# Patient Record
Sex: MALE | Race: WHITE | Employment: FULL TIME | URBAN - METROPOLITAN AREA
[De-identification: names, ages, dates, MRNs, and addresses within clinical notes are randomized per-mention and may not be internally consistent; named-entity substitution may affect disease eponyms.]

---

## 2017-01-09 ENCOUNTER — HOSPITAL ENCOUNTER (OUTPATIENT)
Dept: LAB | Age: 57
Discharge: HOME | End: 2017-01-09

## 2017-01-09 PROCEDURE — 88305 TISSUE EXAM BY PATHOLOGIST: CPT | Performed by: UROLOGY

## 2022-04-04 PROBLEM — Z87.891 EX-SMOKER: Status: ACTIVE | Noted: 2022-04-04

## 2022-04-04 PROBLEM — I65.23 OCCLUSION AND STENOSIS OF BILATERAL CAROTID ARTERIES: Status: ACTIVE | Noted: 2022-04-04

## 2022-04-04 PROBLEM — K21.9 GASTRO-ESOPHAGEAL REFLUX DISEASE WITHOUT ESOPHAGITIS: Status: ACTIVE | Noted: 2021-10-04

## 2022-04-04 PROBLEM — R51.9 HEADACHE: Status: ACTIVE | Noted: 2021-10-04

## 2022-04-04 PROBLEM — I37.9 PULMONARY VALVE DISORDER: Status: ACTIVE | Noted: 2022-04-04

## 2022-04-04 PROBLEM — I36.9 NONRHEUMATIC TRICUSPID VALVE DISORDER: Status: ACTIVE | Noted: 2022-04-04

## 2022-04-04 PROBLEM — H93.13 BILATERAL TINNITUS: Status: ACTIVE | Noted: 2021-10-04

## 2022-04-04 PROBLEM — N40.0 BENIGN PROSTATIC HYPERPLASIA WITHOUT URINARY OBSTRUCTION: Status: ACTIVE | Noted: 2022-04-04

## 2022-04-04 PROBLEM — F41.1 GENERALIZED ANXIETY DISORDER: Status: ACTIVE | Noted: 2021-10-04

## 2022-05-16 ENCOUNTER — HOSPITAL ENCOUNTER (OUTPATIENT)
Dept: LAB | Age: 62
Discharge: HOME | End: 2022-05-16
Payer: COMMERCIAL

## 2022-05-16 DIAGNOSIS — R79.89 ELEVATED FERRITIN: ICD-10-CM

## 2022-05-16 LAB
ALBUMIN SERPL-MCNC: 3.6 G/DL (ref 3.2–4.6)
ALBUMIN/GLOB SERPL: 1.1 {RATIO} (ref 1.2–3.5)
ALP SERPL-CCNC: 51 U/L (ref 50–136)
ALT SERPL-CCNC: 39 U/L (ref 12–65)
ANION GAP SERPL CALC-SCNC: 4 MMOL/L (ref 7–16)
AST SERPL-CCNC: 34 U/L (ref 15–37)
BASOPHILS # BLD: 0 K/UL (ref 0–0.2)
BASOPHILS NFR BLD: 1 % (ref 0–2)
BILIRUB SERPL-MCNC: 0.5 MG/DL (ref 0.2–1.1)
BUN SERPL-MCNC: 17 MG/DL (ref 8–23)
CALCIUM SERPL-MCNC: 8.6 MG/DL (ref 8.3–10.4)
CHLORIDE SERPL-SCNC: 110 MMOL/L (ref 98–107)
CO2 SERPL-SCNC: 28 MMOL/L (ref 21–32)
CREAT SERPL-MCNC: 1.3 MG/DL (ref 0.8–1.5)
DIFFERENTIAL METHOD BLD: NORMAL
EOSINOPHIL # BLD: 0.2 K/UL (ref 0–0.8)
EOSINOPHIL NFR BLD: 3 % (ref 0.5–7.8)
ERYTHROCYTE [DISTWIDTH] IN BLOOD BY AUTOMATED COUNT: 13.2 % (ref 11.9–14.6)
FERRITIN SERPL-MCNC: 281 NG/ML (ref 8–388)
GLOBULIN SER CALC-MCNC: 3.3 G/DL (ref 2.3–3.5)
GLUCOSE SERPL-MCNC: 99 MG/DL (ref 65–100)
HAV IGM SER QL: NONREACTIVE
HBV CORE IGM SER QL: NONREACTIVE
HBV SURFACE AG SER QL: NONREACTIVE
HCT VFR BLD AUTO: 44.9 %
HCV AB SER QL: NONREACTIVE
HGB BLD-MCNC: 14.4 G/DL (ref 13.6–17.2)
IMM GRANULOCYTES # BLD AUTO: 0 K/UL (ref 0–0.5)
IMM GRANULOCYTES NFR BLD AUTO: 0 % (ref 0–5)
IRON SATN MFR SERPL: 40 %
IRON SERPL-MCNC: 121 UG/DL (ref 35–150)
LYMPHOCYTES # BLD: 2 K/UL (ref 0.5–4.6)
LYMPHOCYTES NFR BLD: 31 % (ref 13–44)
MCH RBC QN AUTO: 28.9 PG (ref 26.1–32.9)
MCHC RBC AUTO-ENTMCNC: 32.1 G/DL (ref 31.4–35)
MCV RBC AUTO: 90.2 FL (ref 79.6–97.8)
MONOCYTES # BLD: 0.5 K/UL (ref 0.1–1.3)
MONOCYTES NFR BLD: 8 % (ref 4–12)
NEUTS SEG # BLD: 3.8 K/UL (ref 1.7–8.2)
NEUTS SEG NFR BLD: 58 % (ref 43–78)
NRBC # BLD: 0 K/UL (ref 0–0.2)
PLATELET # BLD AUTO: 209 K/UL (ref 150–450)
PMV BLD AUTO: 11.8 FL (ref 9.4–12.3)
POTASSIUM SERPL-SCNC: 4.4 MMOL/L (ref 3.5–5.1)
PROT SERPL-MCNC: 6.9 G/DL (ref 6.3–8.2)
RBC # BLD AUTO: 4.98 M/UL (ref 4.23–5.6)
SODIUM SERPL-SCNC: 142 MMOL/L (ref 136–145)
TIBC SERPL-MCNC: 304 UG/DL (ref 250–450)
WBC # BLD AUTO: 6.6 K/UL (ref 4.3–11.1)

## 2022-05-16 PROCEDURE — 81256 HFE GENE: CPT

## 2022-05-16 PROCEDURE — 36415 COLL VENOUS BLD VENIPUNCTURE: CPT

## 2022-05-16 PROCEDURE — 80074 ACUTE HEPATITIS PANEL: CPT

## 2022-05-16 PROCEDURE — 80053 COMPREHEN METABOLIC PANEL: CPT

## 2022-05-16 PROCEDURE — 82728 ASSAY OF FERRITIN: CPT

## 2022-05-16 PROCEDURE — 85025 COMPLETE CBC W/AUTO DIFF WBC: CPT

## 2022-05-16 PROCEDURE — 83540 ASSAY OF IRON: CPT

## 2022-05-20 LAB — HFE GENE MUT ANL BLD/T: NORMAL

## 2022-06-06 NOTE — PATIENT INSTRUCTIONS
Patient Instructions from Today's Visit    Reason for Visit:  Follow up - Results review      Plan:  - Reviewed previous work up, no concerns noted. Follow Up:  - as needed      Treatment Summary has been discussed and given to patient: n/a        -------------------------------------------------------------------------------------------------------------------  Please call our office at [phone] if you have any  of the following symptoms:   · Fever of 100.5 or greater  · Chills  · Shortness of breath  · Swelling or pain in one leg    After office hours an answering service is available and will contact a provider for emergencies or if you are experiencing any of the above symptoms.  Patient did not express an interest in My Chart. My Chart log in information explained on the after visit summary printout at the [facility] desk.     Radha Zavala RN

## 2022-06-08 ENCOUNTER — OFFICE VISIT (OUTPATIENT)
Dept: ONCOLOGY | Age: 62
End: 2022-06-08
Payer: COMMERCIAL

## 2022-06-08 VITALS
RESPIRATION RATE: 18 BRPM | TEMPERATURE: 98 F | BODY MASS INDEX: 40.49 KG/M2 | SYSTOLIC BLOOD PRESSURE: 136 MMHG | WEIGHT: 305.5 LBS | DIASTOLIC BLOOD PRESSURE: 80 MMHG | HEIGHT: 73 IN | OXYGEN SATURATION: 95 % | HEART RATE: 71 BPM

## 2022-06-08 DIAGNOSIS — R79.89 ELEVATED FERRITIN: Primary | ICD-10-CM

## 2022-06-08 PROCEDURE — 99213 OFFICE O/P EST LOW 20 MIN: CPT | Performed by: INTERNAL MEDICINE

## 2022-06-08 ASSESSMENT — PATIENT HEALTH QUESTIONNAIRE - PHQ9
SUM OF ALL RESPONSES TO PHQ9 QUESTIONS 1 & 2: 0
SUM OF ALL RESPONSES TO PHQ QUESTIONS 1-9: 0
2. FEELING DOWN, DEPRESSED OR HOPELESS: 0
SUM OF ALL RESPONSES TO PHQ QUESTIONS 1-9: 0
1. LITTLE INTEREST OR PLEASURE IN DOING THINGS: 0

## 2022-06-08 NOTE — PROGRESS NOTES
[facility] Hematology and Oncology: Office Visit Established Patient    Chief Complaint:    Chief Complaint   Patient presents with    Follow-up         History of Present Illness:  Mr. Viviana Bhatti is a 62 y.o. male who returns today for management of an elevated ferritin, most recently 403. This was noted on routine lab work but has been persistent since at least last September, when it was 1500 while he was inpatient at [facility]. No other iron studies are available. We recommended additional iron studies including transferrin sat %, with further work-up to be based on results. He returns for lab results today. No new complaints. Review of Systems:  Constitutional: Negative. HENT: Negative. Eyes: Negative. Respiratory: Negative. Cardiovascular: Negative. Gastrointestinal: Negative. Genitourinary: Negative. Musculoskeletal: Negative. Skin: Negative. Neurological: Negative. Endo/Heme/Allergies: Negative. Psychiatric/Behavioral: Negative. All other systems reviewed and are negative. Allergies   Allergen Reactions    Codeine Other (See Comments)     Dizziness.      Past Medical History:   Diagnosis Date    Elevated PSA      Past Surgical History:   Procedure Laterality Date    UROLOGICAL SURGERY       Family History   Problem Relation Age of Onset    No Known Problems Mother     No Known Problems Father      Social History     Socioeconomic History    Marital status:      Spouse name: Not on file    Number of children: Not on file    Years of education: Not on file    Highest education level: Not on file   Occupational History    Not on file   Tobacco Use    Smoking status: Former Smoker     Quit date: 10/9/1991     Years since quittin.6    Smokeless tobacco: Never Used   Substance and Sexual Activity    Alcohol use: No    Drug use: Not on file    Sexual activity: Not on file   Other Topics Concern    Not on file   Social History Narrative    Not on file     Social Determinants of Health     Financial Resource Strain:     Difficulty of Paying Living Expenses: Not on file   Food Insecurity:     Worried About Running Out of Food in the Last Year: Not on file    [place] of Food in the Last Year: Not on file   Transportation Needs:     Lack of Transportation (Medical): Not on file    Lack of Transportation (Non-Medical):  Not on file   Physical Activity:     Days of Exercise per Week: Not on file    Minutes of Exercise per Session: Not on file   Stress:     Feeling of Stress : Not on file   Social Connections:     Frequency of Communication with Friends and Family: Not on file    Frequency of Social Gatherings with Friends and Family: Not on file    Attends Restorationism Services: Not on file    Active Member of [address] [place] [address] [employer] or Organizations: Not on file    Attends Club or Organization Meetings: Not on file    Marital Status: Not on file   Intimate Partner Violence:     Fear of Current or Ex-Partner: Not on file    Emotionally Abused: Not on file    Physically Abused: Not on file    Sexually Abused: Not on file   Housing Stability:     Unable to Pay for Housing in the Last Year: Not on file    Number of [place] in the Last Year: Not on file    Unstable Housing in the Last Year: Not on file     Current Outpatient Medications   Medication Sig Dispense Refill    omeprazole (PRILOSEC) 40 MG delayed release capsule 1 capsule 30 minutes before morning meal      tamsulosin (FLOMAX) 0.4 MG capsule Take 0.4 mg by mouth daily      diclofenac (VOLTAREN) 50 MG EC tablet TAKE 1 TABLET TWICE A DAY AS NEEDED FOR PAIN WITH FOOD (Patient not taking: Reported on 6/8/2022) 60 tablet 0    colchicine (MITIGARE) 0.6 MG capsule 1 twice a day for 3 days then as needed for gout dispense any controlled colchicine coinsurance, (Patient not taking: Reported on 6/8/2022)      finasteride (PROSCAR) 5 MG tablet Take 5 mg by mouth daily (Patient not taking: Reported on 6/8/2022)       No current facility-administered medications for this visit. OBJECTIVE:  /80 (Site: Left Upper Arm, Position: Standing)   Pulse 71   Temp 98 °F (36.7 °C)   Resp 18   Ht 6' 0.5\" (1.842 m)   Wt (!) 305 lb 8 oz (138.6 kg)   SpO2 95%   BMI 40.86 kg/m²     Physical Exam:  Constitutional: Well developed, well nourished male in no acute distress, sitting comfortably on the examination table. HEENT: Normocephalic and atraumatic. Sclerae anicteric. Skin Warm and dry. No bruising and no rash noted. No erythema. No pallor. Cardiopulmonary Deferred. Neuro Grossly nonfocal with no obvious sensory or motor deficits. MSK Normal range of motion in general.  No edema and no tenderness. Psych Appropriate mood and affect. Labs:  No results found for this or any previous visit (from the past 96 hour(s)). Imaging:  No results found. ASSESSMENT:   Diagnosis Orders   1. Elevated ferritin           PLAN:  Lab studies were personally reviewed. Elevated ferritin: unclear duration but present since at least September 2021 when he was hospitalized for COVID.  1500 at that time, 400 at time of referral.  No other iron studies available. No family history of iron overload disorders. We recommended additional iron studies including transferrin saturation. Thankfully, his repeat ferritin was down to the normal range, at 281, and his TSAT was 40%, making iron overload very unlikely and the hyperferritinemia more likely to have been an acute phase reactant. I see no indication for screening for occult liver disease with the improvement in ferritin, and no indication for HFE gene mutation testing to screen for hemochromatosis. He understands and agrees to proceed. All questions were asked  and answered to the best of my ability. F/u PRN with hematology.             Mark Chan MD, MD  [facility] Hematology and Oncology  [address] [place] [id number]  Office : [phone]  Fax : [phone]

## 2022-09-09 DIAGNOSIS — R97.20 ELEVATED PROSTATE SPECIFIC ANTIGEN (PSA): Primary | ICD-10-CM

## 2022-12-19 ENCOUNTER — OFFICE VISIT (OUTPATIENT)
Dept: UROLOGY | Age: 62
End: 2022-12-19
Payer: COMMERCIAL

## 2022-12-19 DIAGNOSIS — N40.0 BENIGN PROSTATIC HYPERPLASIA WITHOUT LOWER URINARY TRACT SYMPTOMS: ICD-10-CM

## 2022-12-19 DIAGNOSIS — R30.0 DYSURIA: Primary | ICD-10-CM

## 2022-12-19 DIAGNOSIS — R97.20 ELEVATED PROSTATE SPECIFIC ANTIGEN (PSA): ICD-10-CM

## 2022-12-19 LAB
BILIRUBIN, URINE, POC: NEGATIVE
BLOOD URINE, POC: NORMAL
GLUCOSE URINE, POC: NEGATIVE
KETONES, URINE, POC: NEGATIVE
LEUKOCYTE ESTERASE, URINE, POC: NEGATIVE
NITRITE, URINE, POC: NEGATIVE
PH, URINE, POC: 5.5 (ref 4.6–8)
PROTEIN,URINE, POC: NEGATIVE
SPECIFIC GRAVITY, URINE, POC: 1.03 (ref 1–1.03)
URINALYSIS CLARITY, POC: NORMAL
URINALYSIS COLOR, POC: NORMAL
UROBILINOGEN, POC: 0.2

## 2022-12-19 PROCEDURE — 99213 OFFICE O/P EST LOW 20 MIN: CPT | Performed by: UROLOGY

## 2022-12-19 PROCEDURE — 81003 URINALYSIS AUTO W/O SCOPE: CPT | Performed by: UROLOGY

## 2022-12-19 RX ORDER — TAMSULOSIN HYDROCHLORIDE 0.4 MG/1
0.4 CAPSULE ORAL DAILY
Qty: 90 CAPSULE | Refills: 3 | Status: SHIPPED | OUTPATIENT
Start: 2022-12-19

## 2022-12-19 NOTE — PROGRESS NOTES
[facility] Urology  [address]   [address], [address] [facility]  [phone]  Michael Ayala  : 1960    No chief complaint on file. HPI     Michael Ayala is a 65 y.o. male  last seen in  by me for elevated PSA ; Referred by Dr. Aurora Sims for evaluation and treatment of elevated PSA PSA 7.67 , testosterone 309 in 10-16. Takes Doxazosin 2 mg with improvement of LUTS. This was increased to  4 mg. ELIA showed 3+ benign prostate. Prostate biopsy done 17:       PROSTATE VOLUME: 91.25 gm PSAD 0.08 PSA 7.67   Pathology:     DIAGNOSIS    A: \"PROSTATE, LEFT LATERAL BASE, BIOPSY\":    NO CARCINOMA IDENTIFIED. B: \"PROSTATE, LEFT LATERAL MID, BIOPSY\":    NO CARCINOMA IDENTIFIED. INFLAMMATION. C: \"PROSTATE, LEFT LATERAL APEX, BIOPSY\":    NO CARCINOMA IDENTIFIED. INFLAMMATION. D: \"PROSTATE, LEFT BASE, BIOPSY\":    NO CARCINOMA IDENTIFIED. INFLAMMATION. E: \"PROSTATE, LEFT MID, BIOPSY\":    NO CARCINOMA IDENTIFIED. INFLAMMATION. F: \"PROSTATE, LEFT APEX, BIOPSY\":    NO CARCINOMA IDENTIFIED. G: \"PROSTATE, RIGHT BASE, BIOPSY\":    NO CARCINOMA IDENTIFIED. INFLAMMATION. H: \"PROSTATE, RIGHT MID, BIOPSY\":    NO CARCINOMA IDENTIFIED. INFLAMMATION. I: \"PROSTATE, RIGHT APEX, BIOPSY\":    NO CARCINOMA IDENTIFIED. INFLAMMATION. J: \"PROSTATE, RIGHT LATERAL BASE, BIOPSY\":    NO CARCINOMA IDENTIFIED. INFLAMMATION. K: \"PROSTATE, RIGHT LATERAL MID, BIOPSY\":    NO CARCINOMA IDENTIFIED. INFLAMMATION. L: \"PROSTATE, RIGHT LATERAL APEX, BIOPSY\":    NO CARCINOMA IDENTIFIED. INFLAMMATION. Was seen 10-15-21 with c/o not being able to void for about 17 hrs. C/o suprapubic pain. He had Covid 21.  ml by US then. Had exam by Louise Price recently, he states that his PSA was 7. exam showed ELIA: external hemorrhoids, prostate 3+, no nodules. Stevens was inserted with 800 ml urine obtained. Started on tamsulosin. Stevens removed 10-18-21.    He is voiding much better on tamsulosin. PVR 58 ml by US in . In 3-22, total PSA 7.5 with 24.8% free PSA. Past Medical History:   Diagnosis Date    Elevated PSA      Past Surgical History:   Procedure Laterality Date    UROLOGICAL SURGERY       Current Outpatient Medications   Medication Sig Dispense Refill    tamsulosin (FLOMAX) 0.4 MG capsule Take 1 capsule by mouth daily 90 capsule 3    omeprazole (PRILOSEC) 40 MG delayed release capsule 1 capsule 30 minutes before morning meal      diclofenac (VOLTAREN) 50 MG EC tablet TAKE 1 TABLET TWICE A DAY AS NEEDED FOR PAIN WITH FOOD (Patient not taking: Reported on 2022) 60 tablet 0    colchicine (MITIGARE) 0.6 MG capsule 1 twice a day for 3 days then as needed for gout dispense any controlled colchicine coinsurance, (Patient not taking: Reported on 2022)      finasteride (PROSCAR) 5 MG tablet Take 5 mg by mouth daily (Patient not taking: No sig reported)       No current facility-administered medications for this visit. Allergies   Allergen Reactions    Codeine Other (See Comments)     Dizziness.      Social History     Socioeconomic History    Marital status:      Spouse name: Not on file    Number of children: Not on file    Years of education: Not on file    Highest education level: Not on file   Occupational History    Not on file   Tobacco Use    Smoking status: Former     Types: Cigarettes     Quit date: 10/9/1991     Years since quittin.2    Smokeless tobacco: Never   Substance and Sexual Activity    Alcohol use: No    Drug use: Not on file    Sexual activity: Not on file   Other Topics Concern    Not on file   Social History Narrative    Not on file     Social Determinants of Health     Financial Resource Strain: Not on file   Food Insecurity: Not on file   Transportation Needs: Not on file   Physical Activity: Not on file   Stress: Not on file   Social Connections: Not on file   Intimate Partner Violence: Not on file   Housing Stability: Not on file     Family History   Problem Relation Age of Onset    No Known Problems Mother     No Known Problems Father        ROS    There were no vitals taken for this visit. Urinalysis  UA - Dipstick  @LASTPROCAMB([id number])@    UA - Micro  WBC - 0  RBC - 0  Bacteria - 0  Epith - 0    Physical Exam    Assessment and Plan   Diagnosis Orders   1. Dysuria  AMB POC URINALYSIS DIP STICK AUTO W/O MICRO      2. Elevated prostate specific antigen (PSA)  PSA, Diagnostic      3. Benign prostatic hyperplasia without lower urinary tract symptoms  tamsulosin (FLOMAX) 0.4 MG capsule            Orders Placed This Encounter   Procedures    PSA, Diagnostic     Standing Status:   Future     Standing Expiration Date:   12/19/2023    AMB POC URINALYSIS DIP STICK AUTO W/O MICRO       Follow-up and Dispositions    Return in about 3 months (around 3/19/2023) for PSA, CBC before. Refill tamsulosin. Return in about 3 months (around 3/19/2023) for PSA, CBC before.

## 2023-03-20 ENCOUNTER — OFFICE VISIT (OUTPATIENT)
Dept: UROLOGY | Age: 63
End: 2023-03-20
Payer: COMMERCIAL

## 2023-03-20 DIAGNOSIS — N40.0 BENIGN PROSTATIC HYPERPLASIA WITHOUT LOWER URINARY TRACT SYMPTOMS: ICD-10-CM

## 2023-03-20 DIAGNOSIS — N40.0 BENIGN PROSTATIC HYPERPLASIA WITHOUT LOWER URINARY TRACT SYMPTOMS: Primary | ICD-10-CM

## 2023-03-20 LAB
BILIRUBIN, URINE, POC: NEGATIVE
BLOOD URINE, POC: ABNORMAL
GLUCOSE URINE, POC: NEGATIVE
KETONES, URINE, POC: NEGATIVE
LEUKOCYTE ESTERASE, URINE, POC: NEGATIVE
NITRITE, URINE, POC: NEGATIVE
PH, URINE, POC: 5.5 (ref 4.6–8)
PROTEIN,URINE, POC: NEGATIVE
SPECIFIC GRAVITY, URINE, POC: 1.03 (ref 1–1.03)
URINALYSIS CLARITY, POC: ABNORMAL
URINALYSIS COLOR, POC: ABNORMAL
UROBILINOGEN, POC: NORMAL

## 2023-03-20 PROCEDURE — 99213 OFFICE O/P EST LOW 20 MIN: CPT | Performed by: UROLOGY

## 2023-03-20 PROCEDURE — 81003 URINALYSIS AUTO W/O SCOPE: CPT | Performed by: UROLOGY

## 2023-03-20 RX ORDER — TAMSULOSIN HYDROCHLORIDE 0.4 MG/1
0.4 CAPSULE ORAL DAILY
Qty: 90 CAPSULE | Refills: 3 | Status: SHIPPED | OUTPATIENT
Start: 2023-03-20

## 2023-03-20 NOTE — PROGRESS NOTES
obtained. Started on tamsulosin. Stevens removed 10-18-21. He is voiding much better on tamsulosin. PVR 58 ml by US in . In 3-22, total PSA 7.5 with 24.8% free PSA. Past Medical History:   Diagnosis Date    Elevated PSA      Past Surgical History:   Procedure Laterality Date    UROLOGICAL SURGERY       Current Outpatient Medications   Medication Sig Dispense Refill    tamsulosin (FLOMAX) 0.4 MG capsule Take 1 capsule by mouth daily 90 capsule 3     No current facility-administered medications for this visit. Allergies   Allergen Reactions    Codeine Other (See Comments)     Dizziness. Social History     Socioeconomic History    Marital status:      Spouse name: Not on file    Number of children: Not on file    Years of education: Not on file    Highest education level: Not on file   Occupational History    Not on file   Tobacco Use    Smoking status: Former     Types: Cigarettes     Quit date: 10/9/1991     Years since quittin.4    Smokeless tobacco: Never   Substance and Sexual Activity    Alcohol use: No    Drug use: Not on file    Sexual activity: Not on file   Other Topics Concern    Not on file   Social History Narrative    Not on file     Social Determinants of Health     Financial Resource Strain: Not on file   Food Insecurity: Not on file   Transportation Needs: Not on file   Physical Activity: Not on file   Stress: Not on file   Social Connections: Not on file   Intimate Partner Violence: Not on file   Housing Stability: Not on file     Family History   Problem Relation Age of Onset    No Known Problems Mother     No Known Problems Father        ROS    There were no vitals taken for this visit.     Urinalysis  UA - Dipstick  Results for orders placed or performed in visit on 23   AMB POC URINALYSIS DIP STICK AUTO W/O MICRO   Result Value Ref Range    Color, Urine, POC      Clarity, Urine, POC      Glucose, Urine, POC Negative Negative    Bilirubin, Urine, POC Negative

## 2023-03-21 LAB
PSA FREE MFR SERPL: 18.8 %
PSA FREE SERPL-MCNC: 1.8 NG/ML
PSA SERPL-MCNC: 9.6 NG/ML

## 2023-03-27 ENCOUNTER — TELEPHONE (OUTPATIENT)
Dept: UROLOGY | Age: 63
End: 2023-03-27

## 2023-03-28 DIAGNOSIS — R97.20 ELEVATED PROSTATE SPECIFIC ANTIGEN (PSA): Primary | ICD-10-CM

## 2023-10-11 DIAGNOSIS — N40.0 BENIGN PROSTATIC HYPERPLASIA WITHOUT LOWER URINARY TRACT SYMPTOMS: ICD-10-CM

## 2023-10-11 RX ORDER — TAMSULOSIN HYDROCHLORIDE 0.4 MG/1
CAPSULE ORAL DAILY
Qty: 90 CAPSULE | Refills: 3 | Status: SHIPPED | OUTPATIENT
Start: 2023-10-11

## 2024-06-10 ENCOUNTER — NURSE ONLY (OUTPATIENT)
Dept: UROLOGY | Age: 64
End: 2024-06-10

## 2024-06-10 DIAGNOSIS — R97.20 ELEVATED PROSTATE SPECIFIC ANTIGEN (PSA): ICD-10-CM

## 2024-06-10 LAB — PSA SERPL-MCNC: 7.3 NG/ML (ref 0–4)

## 2024-06-17 ENCOUNTER — OFFICE VISIT (OUTPATIENT)
Dept: UROLOGY | Age: 64
End: 2024-06-17
Payer: COMMERCIAL

## 2024-06-17 DIAGNOSIS — R30.0 DYSURIA: ICD-10-CM

## 2024-06-17 DIAGNOSIS — R97.20 ELEVATED PROSTATE SPECIFIC ANTIGEN (PSA): Primary | ICD-10-CM

## 2024-06-17 DIAGNOSIS — N40.0 BENIGN PROSTATIC HYPERPLASIA WITHOUT LOWER URINARY TRACT SYMPTOMS: ICD-10-CM

## 2024-06-17 LAB
BILIRUBIN, URINE, POC: NEGATIVE
BLOOD URINE, POC: NEGATIVE
GLUCOSE URINE, POC: NEGATIVE
KETONES, URINE, POC: NEGATIVE
LEUKOCYTE ESTERASE, URINE, POC: NEGATIVE
NITRITE, URINE, POC: NEGATIVE
PH, URINE, POC: 7.5 (ref 4.6–8)
PROTEIN,URINE, POC: NEGATIVE
SPECIFIC GRAVITY, URINE, POC: 1.02 (ref 1–1.03)
URINALYSIS CLARITY, POC: NORMAL
URINALYSIS COLOR, POC: NORMAL
UROBILINOGEN, POC: NORMAL

## 2024-06-17 PROCEDURE — 81003 URINALYSIS AUTO W/O SCOPE: CPT | Performed by: UROLOGY

## 2024-06-17 PROCEDURE — 99213 OFFICE O/P EST LOW 20 MIN: CPT | Performed by: UROLOGY

## 2024-06-17 RX ORDER — TAMSULOSIN HYDROCHLORIDE 0.4 MG/1
0.4 CAPSULE ORAL DAILY
Qty: 90 CAPSULE | Refills: 3 | Status: SHIPPED | OUTPATIENT
Start: 2024-06-17

## 2024-06-17 ASSESSMENT — ENCOUNTER SYMPTOMS
NAUSEA: 0
BACK PAIN: 0

## 2024-06-17 NOTE — PROGRESS NOTES
[facility]rology  [address] [place] [id number]  [phone]    Stefan Sanchez  : 1960    Chief Complaint   Patient presents with    Follow-up        HPI     Stefan Sanchez is a 63 y.o. male   hx of  elevated PSA, BPH/LUTS on tamsulosin, 90 gm prostate.  ; Referred by Dr. Molina for evaluation and treatment of elevated PSA PSA 7.67 , testosterone 309 in 10-16.      Takes Doxazosin 2 mg with improvement of LUTS. This was increased to  4 mg.    ELIA showed 3+ benign prostate.    Prostate biopsy done 17:       PROSTATE VOLUME: 91.25 gm PSAD 0.08 PSA 7.67   Pathology:     DIAGNOSIS    A: \"PROSTATE, LEFT LATERAL BASE, BIOPSY\":    NO CARCINOMA IDENTIFIED.    B: \"PROSTATE, LEFT LATERAL MID, BIOPSY\":    NO CARCINOMA IDENTIFIED.    INFLAMMATION.    C: \"PROSTATE, LEFT LATERAL APEX, BIOPSY\":    NO CARCINOMA IDENTIFIED.    INFLAMMATION.    D: \"PROSTATE, LEFT BASE, BIOPSY\":    NO CARCINOMA IDENTIFIED.    INFLAMMATION.    E: \"PROSTATE, LEFT MID, BIOPSY\":    NO CARCINOMA IDENTIFIED. INFLAMMATION.    F: \"PROSTATE, LEFT APEX, BIOPSY\":    NO CARCINOMA IDENTIFIED.    G: \"PROSTATE, RIGHT BASE, BIOPSY\":    NO CARCINOMA IDENTIFIED.    INFLAMMATION.    H: \"PROSTATE, RIGHT MID, BIOPSY\":    NO CARCINOMA IDENTIFIED.    INFLAMMATION.    I: \"PROSTATE, RIGHT APEX, BIOPSY\":    NO CARCINOMA IDENTIFIED.    INFLAMMATION.    J: \"PROSTATE, RIGHT LATERAL BASE, BIOPSY\":    NO CARCINOMA IDENTIFIED.    INFLAMMATION.    K: \"PROSTATE, RIGHT LATERAL MID, BIOPSY\":    NO CARCINOMA IDENTIFIED.    INFLAMMATION.    L: \"PROSTATE, RIGHT LATERAL APEX, BIOPSY\":    NO CARCINOMA IDENTIFIED.    INFLAMMATION.       Was seen 10-15-21 with c/o not being able to void for about 17 hrs. C/o suprapubic pain. He had Covid 21.   ml by US then.    Had exam by  recently, he states that his PSA was 7. exam showed ELIA: external hemorrhoids, prostate 3+, no nodules.    Stevens was inserted with 800 ml urine obtained.

## 2024-06-17 NOTE — PROGRESS NOTES
[facility]rology  [address] [place] [id number]  [phone]    Stefan Sanchez  : 1960    Chief Complaint   Patient presents with   • Follow-up        HPI     Stefan Sanchez is a 63 y.o. male    Past Medical History:   Diagnosis Date   • Elevated PSA      Past Surgical History:   Procedure Laterality Date   • UROLOGICAL SURGERY       Current Outpatient Medications   Medication Sig Dispense Refill   • tamsulosin (FLOMAX) 0.4 MG capsule TAKE 1 CAPSULE BY MOUTH EVERY DAY 90 capsule 3     No current facility-administered medications for this visit.     Allergies   Allergen Reactions   • Codeine Other (See Comments)     Dizziness.     Social History     Socioeconomic History   • Marital status:      Spouse name: Not on file   • Number of children: Not on file   • Years of education: Not on file   • Highest education level: Not on file   Occupational History   • Not on file   Tobacco Use   • Smoking status: Former     Current packs/day: 0.00     Types: Cigarettes     Quit date: 10/9/1991     Years since quittin.7   • Smokeless tobacco: Never   Substance and Sexual Activity   • Alcohol use: No   • Drug use: Not on file   • Sexual activity: Not on file   Other Topics Concern   • Not on file   Social History Narrative   • Not on file     Social Determinants of Health     Financial Resource Strain: Not on file   Food Insecurity: Not on file   Transportation Needs: Not on file   Physical Activity: Not on file   Stress: Not on file   Social Connections: Not on file   Intimate Partner Violence: Not on file   Housing Stability: Not on file     Family History   Problem Relation Age of Onset   • No Known Problems Mother    • No Known Problems Father        Review of Systems  Constitutional:   Negative for fever.  GI:  Negative for nausea.  Musculoskeletal:  Negative for back pain.    There were no vitals taken for this visit.    Urinalysis  UA - Dipstick  Results for orders

## 2025-04-10 DIAGNOSIS — N40.0 BENIGN PROSTATIC HYPERPLASIA WITHOUT LOWER URINARY TRACT SYMPTOMS: ICD-10-CM

## 2025-04-10 RX ORDER — TAMSULOSIN HYDROCHLORIDE 0.4 MG/1
CAPSULE ORAL DAILY
Qty: 90 CAPSULE | Refills: 3 | Status: SHIPPED | OUTPATIENT
Start: 2025-04-10